# Patient Record
Sex: FEMALE | Race: WHITE | NOT HISPANIC OR LATINO | URBAN - METROPOLITAN AREA
[De-identification: names, ages, dates, MRNs, and addresses within clinical notes are randomized per-mention and may not be internally consistent; named-entity substitution may affect disease eponyms.]

---

## 2023-04-05 ENCOUNTER — APPOINTMENT (RX ONLY)
Dept: URBAN - METROPOLITAN AREA CLINIC 60 | Facility: CLINIC | Age: 63
Setting detail: DERMATOLOGY
End: 2023-04-05

## 2023-04-05 PROBLEM — D48.5 NEOPLASM OF UNCERTAIN BEHAVIOR OF SKIN: Status: ACTIVE | Noted: 2023-04-05

## 2023-04-05 PROCEDURE — 11102 TANGNTL BX SKIN SINGLE LES: CPT

## 2023-04-05 PROCEDURE — ? BIOPSY BY SHAVE METHOD

## 2023-04-05 NOTE — PROCEDURE: BIOPSY BY SHAVE METHOD
Detail Level: Detailed
Depth Of Biopsy: dermis
Was A Bandage Applied: Yes
Size Of Lesion In Cm: 0.3
X Size Of Lesion In Cm: 0
Anticipated Plan (Based On Presumed Biopsy Results): Send notes to outside derm.
Biopsy Type: H and E
Biopsy Method: Dermablade
Anesthesia Type: 1% lidocaine with epinephrine
Anesthesia Volume In Cc (Will Not Render If 0): 0.5
Hemostasis: Drysol
Wound Care: Petrolatum
Dressing: bandage
Destruction After The Procedure: No
Type Of Destruction Used: Curettage
Curettage Text: The wound bed was treated with curettage after the biopsy was performed.
Cryotherapy Text: The wound bed was treated with cryotherapy after the biopsy was performed.
Electrodesiccation Text: The wound bed was treated with electrodesiccation after the biopsy was performed.
Electrodesiccation And Curettage Text: The wound bed was treated with electrodesiccation and curettage after the biopsy was performed.
Silver Nitrate Text: The wound bed was treated with silver nitrate after the biopsy was performed.
Lab: -10
Lab Facility: [id number]
Path Notes (To The Dermatopathologist): Please test black speck too.
Consent: Written consent was obtained and risks were reviewed including but not limited to scarring, infection, bleeding, scabbing, incomplete removal, nerve damage and allergy to anesthesia.
Post-Care Instructions: I reviewed with the patient in detail post-care instructions. Patient is to keep the biopsy site dry overnight, and then apply bacitracin twice daily until healed. Patient may apply hydrogen peroxide soaks to remove any crusting.
Notification Instructions: Patient will be notified of biopsy results. However, patient instructed to call the office if not contacted within 2 weeks.
Billing Type: Third-Party Bill
Information: Selecting Yes will display possible errors in your note based on the variables you have selected. This validation is only offered as a suggestion for you. PLEASE NOTE THAT THE VALIDATION TEXT WILL BE REMOVED WHEN YOU FINALIZE YOUR NOTE. IF YOU WANT TO FAX A PRELIMINARY NOTE YOU WILL NEED TO TOGGLE THIS TO 'NO' IF YOU DO NOT WANT IT IN YOUR FAXED NOTE.

## 2023-11-20 ENCOUNTER — DOCUMENTATION (OUTPATIENT)
Dept: RADIATION ONCOLOGY | Facility: HOSPITAL | Age: 63
End: 2023-11-20
Payer: COMMERCIAL

## 2023-11-20 NOTE — PROGRESS NOTES
PMH:  Former smoker    Family hx :  Cancer GI tract.    Dr. Oconnell referral:      9/21/21 bx right nose:  Basal cell carcinoma, infiltrative pattern (Pigmented).    Offered Mohs vs re-excision - pt choose re-excision for personal reasons.    11/13/23 Right nasal sidewall:  Basal cell carcinoma, infiltrative and nodular types, Pigmented - lesion extends to the deep margin of the specimen.  Excised down to perichondrium.

## 2023-11-27 PROBLEM — C44.311 BASAL CELL CARCINOMA OF DORSUM OF NOSE: Status: ACTIVE | Noted: 2023-11-27

## 2023-11-28 ENCOUNTER — HOSPITAL ENCOUNTER (OUTPATIENT)
Dept: RADIATION ONCOLOGY | Facility: HOSPITAL | Age: 63
Setting detail: RADIATION/ONCOLOGY SERIES
Discharge: HOME | End: 2023-11-28
Attending: RADIOLOGY
Payer: COMMERCIAL

## 2023-11-28 VITALS
DIASTOLIC BLOOD PRESSURE: 76 MMHG | WEIGHT: 119 LBS | OXYGEN SATURATION: 99 % | SYSTOLIC BLOOD PRESSURE: 139 MMHG | HEART RATE: 99 BPM

## 2023-11-28 DIAGNOSIS — C44.311 BASAL CELL CARCINOMA OF DORSUM OF NOSE: Primary | ICD-10-CM

## 2023-11-28 RX ORDER — VITAMIN B COMPLEX
1 CAPSULE ORAL DAILY
COMMUNITY

## 2023-11-28 RX ORDER — THYROID 60 MG/1
60 TABLET ORAL DAILY
COMMUNITY
End: 2024-06-13 | Stop reason: ALTCHOICE

## 2023-11-28 RX ORDER — VIT C/E/ZN/COPPR/LUTEIN/ZEAXAN 250MG-90MG
5000 CAPSULE ORAL DAILY
COMMUNITY

## 2023-11-28 RX ORDER — LANOLIN ALCOHOL/MO/W.PET/CERES
400 CREAM (GRAM) TOPICAL DAILY
COMMUNITY

## 2023-11-28 ASSESSMENT — PAIN SCALES - GENERAL: PAINLEVEL: 0-NO PAIN

## 2023-11-28 ASSESSMENT — ENCOUNTER SYMPTOMS
PSYCHIATRIC NEGATIVE: 1
MUSCULOSKELETAL NEGATIVE: 1
GASTROINTESTINAL NEGATIVE: 1
CONSTITUTIONAL NEGATIVE: 1
DIFFICULTY URINATING: 0
RESPIRATORY NEGATIVE: 1
OCCASIONAL FEELINGS OF UNSTEADINESS: 0
HEMOPTYSIS: 0
NEUROLOGICAL NEGATIVE: 1
COUGH: 0
CARDIOVASCULAR NEGATIVE: 1
WOUND: 0
PALPITATIONS: 0
EYE PROBLEMS: 1

## 2023-11-28 NOTE — PROGRESS NOTES
Patient ID:  Geneva Martin is a 63 y.o. female.    Referring Physician:   No referring provider defined for this encounter.    Primary Care Provider:  Tomás Pollard DO     Cancer Staging Information:  Cancer Staging   No matching staging information was found for the patient.      Problem List:  Patient Active Problem List    Diagnosis Date Noted    Basal cell carcinoma of dorsum of nose 11/27/2023       Chief Complaint  Chief Complaint   Patient presents with    Skin Cancer     Consult       Subjective      History of Present Illness:  The following portions of the patient's history were reviewed and updated as appropriate: allergies, current medications, past family history, past medical history, past social history and past surgical history.   HPI patient is a fair skinned 63-year-old female who recently developed a lesion on the right side of her nose.  Patient was seen by Dr. Joseph Oconnell plastic surgeon who did a biopsy which showed infiltrative pattern basal cell carcinoma.  Biopsy was done in September 2023.  Patient was offered here for Mohs surgery or reexcision shows reexcision which took place 11/13/2023.  Final pathology showed infiltrative and nodular types basal cell carcinoma with lesion extending to deep margin of the specimen.  According to Dr. Oconnell he excised down to the perichondrium.  Patient was offered either postop radiation therapy versus Mohs surgery for positive deep margin.  Patient presents for consideration of radiation therapy.    Review of Systems:  Review of Systems   Eyes: Positive for eye problems.   Respiratory: Negative for cough and hemoptysis.    Skin: Negative for itching, rash and wound.        Past Medical/Surgical History  Past Medical History:   Diagnosis Date    Skin cancer      Past Surgical History:   Procedure Laterality Date    COLONOSCOPY      SKIN BIOPSY          Current Medications  Current Outpatient Medications   Medication Sig Dispense  Refill    b complex vitamins capsule Take 1 capsule by mouth daily. Folate and b6      cholecalciferol, vitamin D3, 5,000 unit (125 mcg) capsule Take 5,000 Units by mouth daily.      magnesium oxide (MAG-OX) 400 mg (241.3 mg magnesium) tablet Take 400 mg by mouth daily.      thyroid, pork, 60 mg tablet Take 60 mg by mouth daily.       No current facility-administered medications for this encounter.       Allergies  No Known Allergies    Social History  Social History     Socioeconomic History    Marital status:      Spouse name: None    Number of children: 3    Years of education: None    Highest education level: None   Occupational History    Occupation: Retired PT   Tobacco Use    Smoking status: Former     Types: Cigarettes    Smokeless tobacco: Never    Tobacco comments:     In College   Substance and Sexual Activity    Alcohol use: Never    Drug use: Never   Social History Narrative    No distress at this time       Family History  Family History   Problem Relation Age of Onset    Colon cancer Biological Mother     Colon cancer Biological Father     Cervical cancer Maternal Grandmother     No Known Problems Maternal Grandfather       Family Status   Relation Name Status    Bio Mother  (Not Specified)    Bio Father  (Not Specified)    MGM  (Not Specified)    MGF  (Not Specified)               Objective      Physical Exam:  Vital Signs for this encounter:  BSA: There is no height or weight on file to calculate BSA.  Visit Vitals  /76 (Patient Position: Sitting)   Pulse 99   Wt 54 kg (119 lb)   LMP  (LMP Unknown)   SpO2 99%         Physical Exam fair skinned healthy-appearing middle-age appearing female in no apparent distress.  Skin is clear except for small quarter centimeter healed incision right side of nose.  No suspicious lesions or palpable lesions in the vicinity.  Nostrils clear.  No adenopathy appreciated.  Lungs clear.  Performance Status:90     PAIN ASSESSMENT:  Score  Zero    Location    Pain Intervention      LABS:  No results found for this or any previous visit (from the past 4368 hour(s)).       Assessment/Plan Discussed with patient regarding postop radiation therapy to the right side of nose for deep positive margin basal cell skin cancer.  Discussed risks and benefits of treatment including acute effects including skin erythema and tiredness.  Do not expect significant late effects although telangiectasia is possible.  I would certainly stay off her eyes.  I would plan to deliver 5000 cGy in 20 fractions over 4 weeks using electron therapy.  Patient is contemplating her options and is meeting with Mohs surgeon to discuss that option.  Patient knows to contact me if she wants to pursue postop radiation therapy.  She will follow-up with her surgeon.    Diagnoses and Orders Associated With This Visit:  Basal cell carcinoma of dorsum of nose (Primary)      TREATMENT PLAN SUMMARY:         IV CHEMOTHERAPY:  [No matching plan found]

## 2023-11-28 NOTE — PROGRESS NOTES
Review of Systems   Constitutional: Negative.    HENT:  Negative.  Negative for nosebleeds.    Eyes: Positive for eye problems (B/L hx:  vitrius hemorraghe/ macular puckers.  See black spots.  has retinal specialist).   Respiratory: Negative.    Cardiovascular: Negative.  Negative for palpitations (No Pacemaker/No loop/No defib).   Gastrointestinal: Negative.    Genitourinary: Negative for difficulty urinating and vaginal bleeding.    Musculoskeletal: Negative.    Skin: Negative.    Neurological: Negative.    Psychiatric/Behavioral: Negative.      Pt to stay in touch with Dr. Oconnell with decision.

## 2023-11-28 NOTE — LETTER
November 28, 2023                                                          Patient: Geneva Martin   YOB: 1960   Date of Visit: 11/28/2023       Dear Dr. Pollard:    The patient is seen at the [facility] RADIATION THERAPY today. Attached is my assessment and plan of care.  Thank you for the opportunity to share in Geneva Martin's care.       Sincerely,        Gregory J. Ochsner, MD      CC: No Recipients

## 2023-12-07 ENCOUNTER — HOSPITAL ENCOUNTER (OUTPATIENT)
Dept: RADIATION ONCOLOGY | Facility: HOSPITAL | Age: 63
Setting detail: RADIATION/ONCOLOGY SERIES
Discharge: HOME | End: 2023-12-07
Attending: RADIOLOGY
Payer: COMMERCIAL

## 2023-12-07 PROCEDURE — 77290 THER RAD SIMULAJ FIELD CPLX: CPT | Performed by: RADIOLOGY

## 2023-12-07 PROCEDURE — 77321 SPECIAL TELETX PORT PLAN: CPT | Performed by: RADIOLOGY

## 2023-12-07 PROCEDURE — 77334 RADIATION TREATMENT AID(S): CPT | Performed by: RADIOLOGY

## 2023-12-14 ENCOUNTER — HOSPITAL ENCOUNTER (OUTPATIENT)
Dept: RADIATION ONCOLOGY | Facility: HOSPITAL | Age: 63
Setting detail: RADIATION/ONCOLOGY SERIES
Discharge: HOME | End: 2023-12-14
Attending: RADIOLOGY
Payer: COMMERCIAL

## 2023-12-14 LAB
ARIA ZRC COURSE ID: NORMAL
ARIA ZRC COURSE INTENT: NORMAL
ARIA ZRC COURSE START DATE: NORMAL
ARIA ZRC TREATMENT ELAPSED DAYS: NORMAL
ARIA ZRP FRACTIONS TREATED TO DATE: NORMAL
ARIA ZRP PLAN ID: NORMAL
ARIA ZRP PRESCRIBED DOSE CGY: 5000
ARIA ZRP PRESCRIBED DOSE PER FRACTION: 2.5
ARIA ZRR DOSAGE GIVEN TO DATE: 2.5
ARIA ZRR DOSAGE GIVEN TO DATE: 2.78
ARIA ZRR REFERENCE POINT ID: NORMAL
ARIA ZRR REFERENCE POINT ID: NORMAL
ARIA ZRR SESSION DOSAGE GIVEN: 2.5
ARIA ZRR SESSION DOSAGE GIVEN: 2.78
MLH ARIA ZRC TREATMENT DATES: NORMAL

## 2023-12-14 PROCEDURE — 77280 THER RAD SIMULAJ FIELD SMPL: CPT | Performed by: RADIOLOGY

## 2023-12-14 PROCEDURE — 77412 RADIATION TX DELIVERY LVL 3: CPT | Performed by: RADIOLOGY

## 2023-12-14 PROCEDURE — 77332 RADIATION TREATMENT AID(S): CPT | Performed by: RADIOLOGY

## 2023-12-15 ENCOUNTER — HOSPITAL ENCOUNTER (OUTPATIENT)
Dept: RADIATION ONCOLOGY | Facility: HOSPITAL | Age: 63
Setting detail: RADIATION/ONCOLOGY SERIES
Discharge: HOME | End: 2023-12-15
Attending: RADIOLOGY
Payer: COMMERCIAL

## 2023-12-15 LAB
ARIA ZRC COURSE ID: NORMAL
ARIA ZRC COURSE INTENT: NORMAL
ARIA ZRC COURSE START DATE: NORMAL
ARIA ZRC TREATMENT ELAPSED DAYS: NORMAL
ARIA ZRP FRACTIONS TREATED TO DATE: NORMAL
ARIA ZRP PLAN ID: NORMAL
ARIA ZRP PRESCRIBED DOSE CGY: 5000
ARIA ZRP PRESCRIBED DOSE PER FRACTION: 2.5
ARIA ZRR DOSAGE GIVEN TO DATE: 5
ARIA ZRR DOSAGE GIVEN TO DATE: 5.56
ARIA ZRR REFERENCE POINT ID: NORMAL
ARIA ZRR REFERENCE POINT ID: NORMAL
ARIA ZRR SESSION DOSAGE GIVEN: 2.5
ARIA ZRR SESSION DOSAGE GIVEN: 2.78
MLH ARIA ZRC TREATMENT DATES: NORMAL

## 2023-12-15 PROCEDURE — 77412 RADIATION TX DELIVERY LVL 3: CPT | Performed by: RADIOLOGY

## 2023-12-18 ENCOUNTER — HOSPITAL ENCOUNTER (OUTPATIENT)
Dept: RADIATION ONCOLOGY | Facility: HOSPITAL | Age: 63
Setting detail: RADIATION/ONCOLOGY SERIES
Discharge: HOME | End: 2023-12-18
Attending: RADIOLOGY
Payer: COMMERCIAL

## 2023-12-18 LAB
ARIA ZRC COURSE ID: NORMAL
ARIA ZRC COURSE INTENT: NORMAL
ARIA ZRC COURSE START DATE: NORMAL
ARIA ZRC TREATMENT ELAPSED DAYS: NORMAL
ARIA ZRP FRACTIONS TREATED TO DATE: NORMAL
ARIA ZRP PLAN ID: NORMAL
ARIA ZRP PRESCRIBED DOSE CGY: 5000
ARIA ZRP PRESCRIBED DOSE PER FRACTION: 2.5
ARIA ZRR DOSAGE GIVEN TO DATE: 7.5
ARIA ZRR DOSAGE GIVEN TO DATE: 8.33
ARIA ZRR REFERENCE POINT ID: NORMAL
ARIA ZRR REFERENCE POINT ID: NORMAL
ARIA ZRR SESSION DOSAGE GIVEN: 2.5
ARIA ZRR SESSION DOSAGE GIVEN: 2.78
MLH ARIA ZRC TREATMENT DATES: NORMAL

## 2023-12-18 PROCEDURE — 77412 RADIATION TX DELIVERY LVL 3: CPT | Performed by: RADIOLOGY

## 2023-12-19 ENCOUNTER — RAD ONC OTV (OUTPATIENT)
Dept: RADIATION ONCOLOGY | Facility: HOSPITAL | Age: 63
End: 2023-12-19
Payer: COMMERCIAL

## 2023-12-19 ENCOUNTER — HOSPITAL ENCOUNTER (OUTPATIENT)
Dept: RADIATION ONCOLOGY | Facility: HOSPITAL | Age: 63
Setting detail: RADIATION/ONCOLOGY SERIES
Discharge: HOME | End: 2023-12-19
Attending: RADIOLOGY
Payer: COMMERCIAL

## 2023-12-19 VITALS
HEART RATE: 72 BPM | WEIGHT: 119 LBS | TEMPERATURE: 98 F | OXYGEN SATURATION: 100 % | RESPIRATION RATE: 18 BRPM | DIASTOLIC BLOOD PRESSURE: 78 MMHG | SYSTOLIC BLOOD PRESSURE: 135 MMHG

## 2023-12-19 DIAGNOSIS — C44.311 BASAL CELL CARCINOMA OF DORSUM OF NOSE: Primary | ICD-10-CM

## 2023-12-19 LAB
ARIA ZRC COURSE ID: NORMAL
ARIA ZRC COURSE INTENT: NORMAL
ARIA ZRC COURSE START DATE: NORMAL
ARIA ZRC TREATMENT ELAPSED DAYS: NORMAL
ARIA ZRP FRACTIONS TREATED TO DATE: NORMAL
ARIA ZRP PLAN ID: NORMAL
ARIA ZRP PRESCRIBED DOSE CGY: 5000
ARIA ZRP PRESCRIBED DOSE PER FRACTION: 2.5
ARIA ZRR DOSAGE GIVEN TO DATE: 10
ARIA ZRR DOSAGE GIVEN TO DATE: 11.11
ARIA ZRR REFERENCE POINT ID: NORMAL
ARIA ZRR REFERENCE POINT ID: NORMAL
ARIA ZRR SESSION DOSAGE GIVEN: 2.5
ARIA ZRR SESSION DOSAGE GIVEN: 2.78
MLH ARIA ZRC TREATMENT DATES: NORMAL

## 2023-12-19 PROCEDURE — 77412 RADIATION TX DELIVERY LVL 3: CPT | Performed by: RADIOLOGY

## 2023-12-19 ASSESSMENT — ENCOUNTER SYMPTOMS
ALLERGIC/IMMUNOLOGIC NEGATIVE: 1
GASTROINTESTINAL NEGATIVE: 1
RESPIRATORY NEGATIVE: 1
ENDOCRINE NEGATIVE: 1
CONSTITUTIONAL NEGATIVE: 1
NEUROLOGICAL NEGATIVE: 1
MUSCULOSKELETAL NEGATIVE: 1
PSYCHIATRIC NEGATIVE: 1
EYES NEGATIVE: 1
HEMATOLOGIC/LYMPHATIC NEGATIVE: 1
CARDIOVASCULAR NEGATIVE: 1

## 2023-12-19 ASSESSMENT — PAIN SCALES - GENERAL: PAINLEVEL: 0-NO PAIN

## 2023-12-19 NOTE — LETTER
December 19, 2023                                                          Patient: Geneva Martin   YOB: 1960   Date of Visit: 12/19/2023       Dear Dr. Pollard:    The patient is seen at the [facility] RADIATION THERAPY today. Attached is my assessment and plan of care.  Thank you for the opportunity to share in Geneva Martin's care.       Sincerely,        Gregory J. Ochsner, MD      CC: No Recipients

## 2023-12-19 NOTE — PROGRESS NOTES
Subjective      Patient ID: Geneva Martin is a 63 y.o. female.  1960   Diagnosis Plan   1. Basal cell carcinoma of dorsum of nose          Diagnosis:  No diagnosis found.    HPI patient without complaints.    The following have been reviewed and updated as appropriate in this visit:        Review of Systems   Constitutional: Negative.    HENT: Negative.    Eyes: Negative.    Respiratory: Negative.    Cardiovascular: Negative.    Gastrointestinal: Negative.    Endocrine: Negative.    Genitourinary: Negative.    Musculoskeletal: Negative.    Allergic/Immunologic: Negative.    Neurological: Negative.    Hematological: Negative.    Psychiatric/Behavioral: Negative.        Objective     There were no vitals filed for this visit.  There is no height or weight on file to calculate BMI.    Physical Exam healed half centimeter incision right side of nose without skin changes otherwise.  Performance status 90.    Assessment/Plan Continue postop radiation therapy for basal cell skin cancer right side of nose.  Diagnoses and Orders Associated With This Visit:  There are no diagnoses linked to this encounter.

## 2023-12-20 ENCOUNTER — HOSPITAL ENCOUNTER (OUTPATIENT)
Dept: RADIATION ONCOLOGY | Facility: HOSPITAL | Age: 63
Setting detail: RADIATION/ONCOLOGY SERIES
Discharge: HOME | End: 2023-12-20
Attending: RADIOLOGY
Payer: COMMERCIAL

## 2023-12-20 LAB
ARIA ZRC COURSE ID: NORMAL
ARIA ZRC COURSE INTENT: NORMAL
ARIA ZRC COURSE START DATE: NORMAL
ARIA ZRC TREATMENT ELAPSED DAYS: NORMAL
ARIA ZRP FRACTIONS TREATED TO DATE: NORMAL
ARIA ZRP PLAN ID: NORMAL
ARIA ZRP PRESCRIBED DOSE CGY: 5000
ARIA ZRP PRESCRIBED DOSE PER FRACTION: 2.5
ARIA ZRR DOSAGE GIVEN TO DATE: 12.5
ARIA ZRR DOSAGE GIVEN TO DATE: 13.89
ARIA ZRR REFERENCE POINT ID: NORMAL
ARIA ZRR REFERENCE POINT ID: NORMAL
ARIA ZRR SESSION DOSAGE GIVEN: 2.5
ARIA ZRR SESSION DOSAGE GIVEN: 2.78
MLH ARIA ZRC TREATMENT DATES: NORMAL

## 2023-12-20 PROCEDURE — 77412 RADIATION TX DELIVERY LVL 3: CPT | Performed by: RADIOLOGY

## 2023-12-20 PROCEDURE — 77336 RADIATION PHYSICS CONSULT: CPT | Performed by: RADIOLOGY

## 2023-12-21 ENCOUNTER — HOSPITAL ENCOUNTER (OUTPATIENT)
Dept: RADIATION ONCOLOGY | Facility: HOSPITAL | Age: 63
Setting detail: RADIATION/ONCOLOGY SERIES
Discharge: HOME | End: 2023-12-21
Attending: RADIOLOGY
Payer: COMMERCIAL

## 2023-12-21 LAB
ARIA ZRC COURSE ID: NORMAL
ARIA ZRC COURSE INTENT: NORMAL
ARIA ZRC COURSE START DATE: NORMAL
ARIA ZRC TREATMENT ELAPSED DAYS: NORMAL
ARIA ZRP FRACTIONS TREATED TO DATE: NORMAL
ARIA ZRP PLAN ID: NORMAL
ARIA ZRP PRESCRIBED DOSE CGY: 5000
ARIA ZRP PRESCRIBED DOSE PER FRACTION: 2.5
ARIA ZRR DOSAGE GIVEN TO DATE: 15
ARIA ZRR DOSAGE GIVEN TO DATE: 16.67
ARIA ZRR REFERENCE POINT ID: NORMAL
ARIA ZRR REFERENCE POINT ID: NORMAL
ARIA ZRR SESSION DOSAGE GIVEN: 2.5
ARIA ZRR SESSION DOSAGE GIVEN: 2.78
MLH ARIA ZRC TREATMENT DATES: NORMAL

## 2023-12-21 PROCEDURE — 77412 RADIATION TX DELIVERY LVL 3: CPT | Performed by: RADIOLOGY

## 2023-12-22 ENCOUNTER — HOSPITAL ENCOUNTER (OUTPATIENT)
Dept: RADIATION ONCOLOGY | Facility: HOSPITAL | Age: 63
Setting detail: RADIATION/ONCOLOGY SERIES
Discharge: HOME | End: 2023-12-22
Attending: RADIOLOGY
Payer: COMMERCIAL

## 2023-12-22 LAB
ARIA ZRC COURSE ID: NORMAL
ARIA ZRC COURSE INTENT: NORMAL
ARIA ZRC COURSE START DATE: NORMAL
ARIA ZRC TREATMENT ELAPSED DAYS: NORMAL
ARIA ZRP FRACTIONS TREATED TO DATE: NORMAL
ARIA ZRP PLAN ID: NORMAL
ARIA ZRP PRESCRIBED DOSE CGY: 5000
ARIA ZRP PRESCRIBED DOSE PER FRACTION: 2.5
ARIA ZRR DOSAGE GIVEN TO DATE: 17.5
ARIA ZRR DOSAGE GIVEN TO DATE: 19.44
ARIA ZRR REFERENCE POINT ID: NORMAL
ARIA ZRR REFERENCE POINT ID: NORMAL
ARIA ZRR SESSION DOSAGE GIVEN: 2.5
ARIA ZRR SESSION DOSAGE GIVEN: 2.78
MLH ARIA ZRC TREATMENT DATES: NORMAL

## 2023-12-22 PROCEDURE — 77412 RADIATION TX DELIVERY LVL 3: CPT | Performed by: RADIOLOGY

## 2023-12-26 ENCOUNTER — RAD ONC OTV (OUTPATIENT)
Dept: RADIATION ONCOLOGY | Facility: HOSPITAL | Age: 63
End: 2023-12-26
Payer: COMMERCIAL

## 2023-12-26 ENCOUNTER — HOSPITAL ENCOUNTER (OUTPATIENT)
Dept: RADIATION ONCOLOGY | Facility: HOSPITAL | Age: 63
Setting detail: RADIATION/ONCOLOGY SERIES
Discharge: HOME | End: 2023-12-26
Attending: RADIOLOGY
Payer: COMMERCIAL

## 2023-12-26 VITALS — SYSTOLIC BLOOD PRESSURE: 132 MMHG | HEART RATE: 112 BPM | DIASTOLIC BLOOD PRESSURE: 75 MMHG

## 2023-12-26 DIAGNOSIS — C44.311 BASAL CELL CARCINOMA OF DORSUM OF NOSE: Primary | ICD-10-CM

## 2023-12-26 LAB
ARIA ZRC COURSE ID: NORMAL
ARIA ZRC COURSE INTENT: NORMAL
ARIA ZRC COURSE START DATE: NORMAL
ARIA ZRC TREATMENT ELAPSED DAYS: NORMAL
ARIA ZRP FRACTIONS TREATED TO DATE: NORMAL
ARIA ZRP PLAN ID: NORMAL
ARIA ZRP PRESCRIBED DOSE CGY: 5000
ARIA ZRP PRESCRIBED DOSE PER FRACTION: 2.5
ARIA ZRR DOSAGE GIVEN TO DATE: 20
ARIA ZRR DOSAGE GIVEN TO DATE: 22.22
ARIA ZRR REFERENCE POINT ID: NORMAL
ARIA ZRR REFERENCE POINT ID: NORMAL
ARIA ZRR SESSION DOSAGE GIVEN: 2.5
ARIA ZRR SESSION DOSAGE GIVEN: 2.78
MLH ARIA ZRC TREATMENT DATES: NORMAL

## 2023-12-26 PROCEDURE — 77412 RADIATION TX DELIVERY LVL 3: CPT | Performed by: RADIOLOGY

## 2023-12-26 ASSESSMENT — ENCOUNTER SYMPTOMS
MUSCULOSKELETAL NEGATIVE: 1
COLOR CHANGE: 1
PSYCHIATRIC NEGATIVE: 1
CONSTITUTIONAL NEGATIVE: 1

## 2023-12-26 ASSESSMENT — PAIN SCALES - GENERAL: PAINLEVEL: 0-NO PAIN

## 2023-12-26 NOTE — LETTER
December 26, 2023                                                          Patient: Geneva Martin   YOB: 1960   Date of Visit: 12/26/2023       Dear Dr. Pollard:    The patient is seen at the [facility] RADIATION THERAPY today. Attached is my assessment and plan of care.  Thank you for the opportunity to share in Geneva Martin's care.       Sincerely,        Gregory J. Ochsner, MD      CC: No Recipients

## 2023-12-26 NOTE — PROGRESS NOTES
Subjective      Patient ID: Geneva Martin is a 63 y.o. female.  1960   Diagnosis Plan   1. Basal cell carcinoma of dorsum of nose          Diagnosis:  No diagnosis found.    HPI note small amount of blood from nostril otherwise without complaints.    The following have been reviewed and updated as appropriate in this visit:        Review of Systems   Constitutional: Negative.    HENT: Positive for nosebleeds (strands of blood only).    Musculoskeletal: Negative.    Skin: Positive for color change (scant erythema/ pt is using aquaphor.).   Psychiatric/Behavioral: Negative.        Objective     Vitals:    12/26/23 0835   BP: 132/75   Patient Position: Sitting   Pulse: (!) 112     There is no height or weight on file to calculate BMI.    Physical Exam healed incision right-sided nose otherwise skin clear.  No erythema noted.  Nostrils clear.  Performance status 90.    Assessment/Plan Continue postop radiation therapy to right side of the nose for early basal cell cancer as previously outlined.  Continue Aquaphor ointment in nostril and around as needed.  Patient also to use over-the-counter moisturizer for mild radiation dermatitis pending.  Diagnoses and Orders Associated With This Visit:  There are no diagnoses linked to this encounter.

## 2023-12-27 ENCOUNTER — HOSPITAL ENCOUNTER (OUTPATIENT)
Dept: RADIATION ONCOLOGY | Facility: HOSPITAL | Age: 63
Setting detail: RADIATION/ONCOLOGY SERIES
Discharge: HOME | End: 2023-12-27
Attending: RADIOLOGY
Payer: COMMERCIAL

## 2023-12-27 LAB
ARIA ZRC COURSE ID: NORMAL
ARIA ZRC COURSE INTENT: NORMAL
ARIA ZRC COURSE START DATE: NORMAL
ARIA ZRC TREATMENT ELAPSED DAYS: NORMAL
ARIA ZRP FRACTIONS TREATED TO DATE: NORMAL
ARIA ZRP PLAN ID: NORMAL
ARIA ZRP PRESCRIBED DOSE CGY: 5000
ARIA ZRP PRESCRIBED DOSE PER FRACTION: 2.5
ARIA ZRR DOSAGE GIVEN TO DATE: 22.5
ARIA ZRR DOSAGE GIVEN TO DATE: 25
ARIA ZRR REFERENCE POINT ID: NORMAL
ARIA ZRR REFERENCE POINT ID: NORMAL
ARIA ZRR SESSION DOSAGE GIVEN: 2.5
ARIA ZRR SESSION DOSAGE GIVEN: 2.78
MLH ARIA ZRC TREATMENT DATES: NORMAL

## 2023-12-27 PROCEDURE — 77412 RADIATION TX DELIVERY LVL 3: CPT | Performed by: RADIOLOGY

## 2023-12-27 PROCEDURE — 77336 RADIATION PHYSICS CONSULT: CPT | Performed by: RADIOLOGY

## 2023-12-28 ENCOUNTER — HOSPITAL ENCOUNTER (OUTPATIENT)
Dept: RADIATION ONCOLOGY | Facility: HOSPITAL | Age: 63
Setting detail: RADIATION/ONCOLOGY SERIES
Discharge: HOME | End: 2023-12-28
Attending: RADIOLOGY
Payer: COMMERCIAL

## 2023-12-28 LAB
ARIA ZRC COURSE ID: NORMAL
ARIA ZRC COURSE INTENT: NORMAL
ARIA ZRC COURSE START DATE: NORMAL
ARIA ZRC TREATMENT ELAPSED DAYS: NORMAL
ARIA ZRP FRACTIONS TREATED TO DATE: NORMAL
ARIA ZRP PLAN ID: NORMAL
ARIA ZRP PRESCRIBED DOSE CGY: 5000
ARIA ZRP PRESCRIBED DOSE PER FRACTION: 2.5
ARIA ZRR DOSAGE GIVEN TO DATE: 25
ARIA ZRR DOSAGE GIVEN TO DATE: 27.78
ARIA ZRR REFERENCE POINT ID: NORMAL
ARIA ZRR REFERENCE POINT ID: NORMAL
ARIA ZRR SESSION DOSAGE GIVEN: 2.5
ARIA ZRR SESSION DOSAGE GIVEN: 2.78
MLH ARIA ZRC TREATMENT DATES: NORMAL

## 2023-12-28 PROCEDURE — 77412 RADIATION TX DELIVERY LVL 3: CPT | Performed by: RADIOLOGY

## 2023-12-29 ENCOUNTER — HOSPITAL ENCOUNTER (OUTPATIENT)
Dept: RADIATION ONCOLOGY | Facility: HOSPITAL | Age: 63
Setting detail: RADIATION/ONCOLOGY SERIES
Discharge: HOME | End: 2023-12-29
Attending: RADIOLOGY
Payer: COMMERCIAL

## 2023-12-29 LAB
ARIA ZRC COURSE ID: NORMAL
ARIA ZRC COURSE INTENT: NORMAL
ARIA ZRC COURSE START DATE: NORMAL
ARIA ZRC TREATMENT ELAPSED DAYS: NORMAL
ARIA ZRP FRACTIONS TREATED TO DATE: NORMAL
ARIA ZRP PLAN ID: NORMAL
ARIA ZRP PRESCRIBED DOSE CGY: 5000
ARIA ZRP PRESCRIBED DOSE PER FRACTION: 2.5
ARIA ZRR DOSAGE GIVEN TO DATE: 27.5
ARIA ZRR DOSAGE GIVEN TO DATE: 30.56
ARIA ZRR REFERENCE POINT ID: NORMAL
ARIA ZRR REFERENCE POINT ID: NORMAL
ARIA ZRR SESSION DOSAGE GIVEN: 2.5
ARIA ZRR SESSION DOSAGE GIVEN: 2.78
MLH ARIA ZRC TREATMENT DATES: NORMAL

## 2023-12-29 PROCEDURE — 77412 RADIATION TX DELIVERY LVL 3: CPT | Performed by: RADIOLOGY

## 2024-01-02 ENCOUNTER — HOSPITAL ENCOUNTER (OUTPATIENT)
Dept: RADIATION ONCOLOGY | Facility: HOSPITAL | Age: 64
Setting detail: RADIATION/ONCOLOGY SERIES
Discharge: HOME | End: 2024-01-02
Attending: RADIOLOGY
Payer: COMMERCIAL

## 2024-01-02 ENCOUNTER — RAD ONC OTV (OUTPATIENT)
Dept: RADIATION ONCOLOGY | Facility: HOSPITAL | Age: 64
End: 2024-01-02
Payer: COMMERCIAL

## 2024-01-02 VITALS — OXYGEN SATURATION: 100 % | DIASTOLIC BLOOD PRESSURE: 85 MMHG | SYSTOLIC BLOOD PRESSURE: 129 MMHG | HEART RATE: 99 BPM

## 2024-01-02 DIAGNOSIS — C44.311 BASAL CELL CARCINOMA OF DORSUM OF NOSE: Primary | ICD-10-CM

## 2024-01-02 LAB
ARIA ZRC COURSE ID: NORMAL
ARIA ZRC COURSE INTENT: NORMAL
ARIA ZRC COURSE START DATE: NORMAL
ARIA ZRC TREATMENT ELAPSED DAYS: NORMAL
ARIA ZRP FRACTIONS TREATED TO DATE: NORMAL
ARIA ZRP PLAN ID: NORMAL
ARIA ZRP PRESCRIBED DOSE CGY: 5000
ARIA ZRP PRESCRIBED DOSE PER FRACTION: 2.5
ARIA ZRR DOSAGE GIVEN TO DATE: 30
ARIA ZRR DOSAGE GIVEN TO DATE: 33.33
ARIA ZRR REFERENCE POINT ID: NORMAL
ARIA ZRR REFERENCE POINT ID: NORMAL
ARIA ZRR SESSION DOSAGE GIVEN: 2.5
ARIA ZRR SESSION DOSAGE GIVEN: 2.78
MLH ARIA ZRC TREATMENT DATES: NORMAL

## 2024-01-02 PROCEDURE — 77412 RADIATION TX DELIVERY LVL 3: CPT | Performed by: RADIOLOGY

## 2024-01-02 RX ORDER — SILVER SULFADIAZINE 10 G/1000G
CREAM TOPICAL 2 TIMES DAILY
Qty: 50 G | Refills: 1 | Status: SHIPPED | OUTPATIENT
Start: 2024-01-02 | End: 2024-06-13 | Stop reason: ALTCHOICE

## 2024-01-02 ASSESSMENT — ENCOUNTER SYMPTOMS
PSYCHIATRIC NEGATIVE: 1
HEMATOLOGIC/LYMPHATIC NEGATIVE: 1
OCCASIONAL FEELINGS OF UNSTEADINESS: 0
MUSCULOSKELETAL NEGATIVE: 1
EYES NEGATIVE: 1
COLOR CHANGE: 1
NEUROLOGICAL NEGATIVE: 1
GASTROINTESTINAL NEGATIVE: 1
CONSTITUTIONAL NEGATIVE: 1
CARDIOVASCULAR NEGATIVE: 1
ENDOCRINE NEGATIVE: 1
ALLERGIC/IMMUNOLOGIC NEGATIVE: 1
RESPIRATORY NEGATIVE: 1

## 2024-01-02 ASSESSMENT — PAIN SCALES - GENERAL: PAINLEVEL: 0-NO PAIN

## 2024-01-02 NOTE — LETTER
January 2, 2024                                                          Patient: Geneva Martin   YOB: 1960   Date of Visit: 1/2/2024       Dear Dr. Pollard:    The patient is seen at the [facility] RADIATION THERAPY today. Attached is my assessment and plan of care.  Thank you for the opportunity to share in Geneva Martin's care.       Sincerely,        Gregory J. Ochsner, MD      CC: No Recipients

## 2024-01-02 NOTE — PROGRESS NOTES
Subjective      Patient ID: Geneva Martin is a 63 y.o. female.  1960   Diagnosis Plan   1. Basal cell carcinoma of dorsum of nose          Diagnosis:  No diagnosis found.    HPI patient notes skin erythema discomfort helped with Aquaphor ointment.  Blood per nostril with dried blood in the morning.  Nasal congestion.    The following have been reviewed and updated as appropriate in this visit:        Review of Systems   Constitutional: Negative.    HENT: Positive for nosebleeds (bleeding on occasion ), postnasal drip (allergy related ) and sneezing (increased sneezing when feeling dry).    Eyes: Negative.    Respiratory: Negative.    Cardiovascular: Negative.    Gastrointestinal: Negative.    Endocrine: Negative.    Genitourinary: Negative.    Musculoskeletal: Negative.    Skin: Positive for color change (slight erythema ).   Allergic/Immunologic: Negative.    Neurological: Negative.    Hematological: Negative.    Psychiatric/Behavioral: Negative.        Objective     Vitals:    01/02/24 0914   BP: 129/85   BP Location: Right upper arm   Patient Position: Sitting   Pulse: 99   SpO2: 100%     There is no height or weight on file to calculate BMI.    Physical Exam mild to moderate there is skin erythema right side of nose consistent with radiation dermatitis.  No desquamation noted.  Nostrils clear.  Performance status 90.    Assessment/Plan Continue postop radiation therapy using electron therapy as previously outlined.  Continue Aquaphor ointment prescribed Silvadene cream to replace when needed.  Goes to [place] after treatment will follow-up as needed.  Diagnoses and Orders Associated With This Visit:  There are no diagnoses linked to this encounter.

## 2024-01-03 ENCOUNTER — HOSPITAL ENCOUNTER (OUTPATIENT)
Dept: RADIATION ONCOLOGY | Facility: HOSPITAL | Age: 64
Setting detail: RADIATION/ONCOLOGY SERIES
Discharge: HOME | End: 2024-01-03
Attending: RADIOLOGY
Payer: COMMERCIAL

## 2024-01-03 LAB
ARIA ZRC COURSE ID: NORMAL
ARIA ZRC COURSE INTENT: NORMAL
ARIA ZRC COURSE START DATE: NORMAL
ARIA ZRC TREATMENT ELAPSED DAYS: NORMAL
ARIA ZRP FRACTIONS TREATED TO DATE: NORMAL
ARIA ZRP PLAN ID: NORMAL
ARIA ZRP PRESCRIBED DOSE CGY: 5000
ARIA ZRP PRESCRIBED DOSE PER FRACTION: 2.5
ARIA ZRR DOSAGE GIVEN TO DATE: 32.5
ARIA ZRR DOSAGE GIVEN TO DATE: 36.11
ARIA ZRR REFERENCE POINT ID: NORMAL
ARIA ZRR REFERENCE POINT ID: NORMAL
ARIA ZRR SESSION DOSAGE GIVEN: 2.5
ARIA ZRR SESSION DOSAGE GIVEN: 2.78
MLH ARIA ZRC TREATMENT DATES: NORMAL

## 2024-01-03 PROCEDURE — 77412 RADIATION TX DELIVERY LVL 3: CPT | Performed by: RADIOLOGY

## 2024-01-04 ENCOUNTER — HOSPITAL ENCOUNTER (OUTPATIENT)
Dept: RADIATION ONCOLOGY | Facility: HOSPITAL | Age: 64
Setting detail: RADIATION/ONCOLOGY SERIES
Discharge: HOME | End: 2024-01-04
Attending: RADIOLOGY
Payer: COMMERCIAL

## 2024-01-04 LAB
ARIA ZRC COURSE ID: NORMAL
ARIA ZRC COURSE INTENT: NORMAL
ARIA ZRC COURSE START DATE: NORMAL
ARIA ZRC TREATMENT ELAPSED DAYS: NORMAL
ARIA ZRP FRACTIONS TREATED TO DATE: NORMAL
ARIA ZRP PLAN ID: NORMAL
ARIA ZRP PRESCRIBED DOSE CGY: 5000
ARIA ZRP PRESCRIBED DOSE PER FRACTION: 2.5
ARIA ZRR DOSAGE GIVEN TO DATE: 35
ARIA ZRR DOSAGE GIVEN TO DATE: 38.89
ARIA ZRR REFERENCE POINT ID: NORMAL
ARIA ZRR REFERENCE POINT ID: NORMAL
ARIA ZRR SESSION DOSAGE GIVEN: 2.5
ARIA ZRR SESSION DOSAGE GIVEN: 2.78
MLH ARIA ZRC TREATMENT DATES: NORMAL

## 2024-01-04 PROCEDURE — 77412 RADIATION TX DELIVERY LVL 3: CPT | Performed by: RADIOLOGY

## 2024-01-04 PROCEDURE — 77336 RADIATION PHYSICS CONSULT: CPT | Performed by: RADIOLOGY

## 2024-01-05 ENCOUNTER — HOSPITAL ENCOUNTER (OUTPATIENT)
Dept: RADIATION ONCOLOGY | Facility: HOSPITAL | Age: 64
Setting detail: RADIATION/ONCOLOGY SERIES
Discharge: HOME | End: 2024-01-05
Attending: RADIOLOGY
Payer: COMMERCIAL

## 2024-01-05 LAB
ARIA ZRC COURSE ID: NORMAL
ARIA ZRC COURSE INTENT: NORMAL
ARIA ZRC COURSE START DATE: NORMAL
ARIA ZRC TREATMENT ELAPSED DAYS: NORMAL
ARIA ZRP FRACTIONS TREATED TO DATE: NORMAL
ARIA ZRP PLAN ID: NORMAL
ARIA ZRP PRESCRIBED DOSE CGY: 5000
ARIA ZRP PRESCRIBED DOSE PER FRACTION: 2.5
ARIA ZRR DOSAGE GIVEN TO DATE: 37.5
ARIA ZRR DOSAGE GIVEN TO DATE: 41.67
ARIA ZRR REFERENCE POINT ID: NORMAL
ARIA ZRR REFERENCE POINT ID: NORMAL
ARIA ZRR SESSION DOSAGE GIVEN: 2.5
ARIA ZRR SESSION DOSAGE GIVEN: 2.78
MLH ARIA ZRC TREATMENT DATES: NORMAL

## 2024-01-05 PROCEDURE — 77412 RADIATION TX DELIVERY LVL 3: CPT | Performed by: RADIOLOGY

## 2024-01-08 ENCOUNTER — HOSPITAL ENCOUNTER (OUTPATIENT)
Dept: RADIATION ONCOLOGY | Facility: HOSPITAL | Age: 64
Setting detail: RADIATION/ONCOLOGY SERIES
Discharge: HOME | End: 2024-01-08
Attending: RADIOLOGY
Payer: COMMERCIAL

## 2024-01-08 LAB
ARIA ZRC COURSE ID: NORMAL
ARIA ZRC COURSE INTENT: NORMAL
ARIA ZRC COURSE START DATE: NORMAL
ARIA ZRC TREATMENT ELAPSED DAYS: NORMAL
ARIA ZRP FRACTIONS TREATED TO DATE: NORMAL
ARIA ZRP PLAN ID: NORMAL
ARIA ZRP PRESCRIBED DOSE CGY: 5000
ARIA ZRP PRESCRIBED DOSE PER FRACTION: 2.5
ARIA ZRR DOSAGE GIVEN TO DATE: 40
ARIA ZRR DOSAGE GIVEN TO DATE: 44.44
ARIA ZRR REFERENCE POINT ID: NORMAL
ARIA ZRR REFERENCE POINT ID: NORMAL
ARIA ZRR SESSION DOSAGE GIVEN: 2.5
ARIA ZRR SESSION DOSAGE GIVEN: 2.78
MLH ARIA ZRC TREATMENT DATES: NORMAL

## 2024-01-08 PROCEDURE — 77412 RADIATION TX DELIVERY LVL 3: CPT | Performed by: RADIOLOGY

## 2024-01-09 ENCOUNTER — HOSPITAL ENCOUNTER (OUTPATIENT)
Dept: RADIATION ONCOLOGY | Facility: HOSPITAL | Age: 64
Setting detail: RADIATION/ONCOLOGY SERIES
Discharge: HOME | End: 2024-01-09
Attending: RADIOLOGY
Payer: COMMERCIAL

## 2024-01-09 LAB
ARIA ZRC COURSE ID: NORMAL
ARIA ZRC COURSE INTENT: NORMAL
ARIA ZRC COURSE START DATE: NORMAL
ARIA ZRC TREATMENT ELAPSED DAYS: NORMAL
ARIA ZRP FRACTIONS TREATED TO DATE: NORMAL
ARIA ZRP PLAN ID: NORMAL
ARIA ZRP PRESCRIBED DOSE CGY: 5000
ARIA ZRP PRESCRIBED DOSE PER FRACTION: 2.5
ARIA ZRR DOSAGE GIVEN TO DATE: 42.5
ARIA ZRR DOSAGE GIVEN TO DATE: 47.22
ARIA ZRR REFERENCE POINT ID: NORMAL
ARIA ZRR REFERENCE POINT ID: NORMAL
ARIA ZRR SESSION DOSAGE GIVEN: 2.5
ARIA ZRR SESSION DOSAGE GIVEN: 2.78
MLH ARIA ZRC TREATMENT DATES: NORMAL

## 2024-01-09 PROCEDURE — 77412 RADIATION TX DELIVERY LVL 3: CPT | Performed by: RADIOLOGY

## 2024-01-10 ENCOUNTER — HOSPITAL ENCOUNTER (OUTPATIENT)
Dept: RADIATION ONCOLOGY | Facility: HOSPITAL | Age: 64
Setting detail: RADIATION/ONCOLOGY SERIES
Discharge: HOME | End: 2024-01-10
Attending: RADIOLOGY
Payer: COMMERCIAL

## 2024-01-10 LAB
ARIA ZRC COURSE ID: NORMAL
ARIA ZRC COURSE INTENT: NORMAL
ARIA ZRC COURSE START DATE: NORMAL
ARIA ZRC TREATMENT ELAPSED DAYS: NORMAL
ARIA ZRP FRACTIONS TREATED TO DATE: NORMAL
ARIA ZRP PLAN ID: NORMAL
ARIA ZRP PRESCRIBED DOSE CGY: 5000
ARIA ZRP PRESCRIBED DOSE PER FRACTION: 2.5
ARIA ZRR DOSAGE GIVEN TO DATE: 45
ARIA ZRR DOSAGE GIVEN TO DATE: 50
ARIA ZRR REFERENCE POINT ID: NORMAL
ARIA ZRR REFERENCE POINT ID: NORMAL
ARIA ZRR SESSION DOSAGE GIVEN: 2.5
ARIA ZRR SESSION DOSAGE GIVEN: 2.78
MLH ARIA ZRC TREATMENT DATES: NORMAL

## 2024-01-10 PROCEDURE — 77412 RADIATION TX DELIVERY LVL 3: CPT | Performed by: RADIOLOGY

## 2024-01-11 ENCOUNTER — HOSPITAL ENCOUNTER (OUTPATIENT)
Dept: RADIATION ONCOLOGY | Facility: HOSPITAL | Age: 64
Setting detail: RADIATION/ONCOLOGY SERIES
Discharge: HOME | End: 2024-01-11
Attending: RADIOLOGY
Payer: COMMERCIAL

## 2024-01-11 ENCOUNTER — RAD ONC OTV (OUTPATIENT)
Dept: RADIATION ONCOLOGY | Facility: HOSPITAL | Age: 64
End: 2024-01-11
Payer: COMMERCIAL

## 2024-01-11 VITALS — HEART RATE: 90 BPM | DIASTOLIC BLOOD PRESSURE: 74 MMHG | SYSTOLIC BLOOD PRESSURE: 136 MMHG

## 2024-01-11 DIAGNOSIS — C44.311 BASAL CELL CARCINOMA OF DORSUM OF NOSE: Primary | ICD-10-CM

## 2024-01-11 LAB
ARIA ZRC COURSE ID: NORMAL
ARIA ZRC COURSE INTENT: NORMAL
ARIA ZRC COURSE START DATE: NORMAL
ARIA ZRC TREATMENT ELAPSED DAYS: NORMAL
ARIA ZRP FRACTIONS TREATED TO DATE: NORMAL
ARIA ZRP PLAN ID: NORMAL
ARIA ZRP PRESCRIBED DOSE CGY: 5000
ARIA ZRP PRESCRIBED DOSE PER FRACTION: 2.5
ARIA ZRR DOSAGE GIVEN TO DATE: 47.5
ARIA ZRR DOSAGE GIVEN TO DATE: 52.78
ARIA ZRR REFERENCE POINT ID: NORMAL
ARIA ZRR REFERENCE POINT ID: NORMAL
ARIA ZRR SESSION DOSAGE GIVEN: 2.5
ARIA ZRR SESSION DOSAGE GIVEN: 2.78
MLH ARIA ZRC TREATMENT DATES: NORMAL

## 2024-01-11 PROCEDURE — 77336 RADIATION PHYSICS CONSULT: CPT | Performed by: RADIOLOGY

## 2024-01-11 PROCEDURE — 77412 RADIATION TX DELIVERY LVL 3: CPT | Performed by: RADIOLOGY

## 2024-01-11 ASSESSMENT — ENCOUNTER SYMPTOMS
COLOR CHANGE: 1
MUSCULOSKELETAL NEGATIVE: 1
PSYCHIATRIC NEGATIVE: 1
CONSTITUTIONAL NEGATIVE: 1

## 2024-01-11 ASSESSMENT — PAIN SCALES - GENERAL: PAINLEVEL: 0-NO PAIN

## 2024-01-11 NOTE — PROGRESS NOTES
Subjective      Patient ID: Geneva Martin is a 63 y.o. female.  1960   Diagnosis Plan   1. Basal cell carcinoma of dorsum of nose          Diagnosis:  No diagnosis found.    HPI patient notes mild skin irritation nose and nostril Silvadene cream and Aquaphor help.    The following have been reviewed and updated as appropriate in this visit:        Review of Systems   Constitutional: Negative.    HENT: Positive for nosebleeds (clotting inside nose.  Pt is using Silvadene in nose with good results).    Musculoskeletal: Negative.    Skin: Positive for color change (Increased erythema/Pt is using Silvadene and Aquaphor.).   Psychiatric/Behavioral: Negative.    EOT tomorrow.    Objective     Vitals:    01/11/24 0834   BP: 136/74   Pulse: 90     There is no height or weight on file to calculate BMI.    Physical Exam mild erythema and of nose.  Healed incision no desquamation noted.  Nostrils clear.  Crackles at base of nose otherwise clear.    Assessment/Plan Continue radiation therapy to 5000 cGy as previously outlined.  Follow-up in the spring after return from [place].  Continue Aquaphor ointment and Silvadene cream for radiation dermatitis.  Diagnoses and Orders Associated With This Visit:  There are no diagnoses linked to this encounter.

## 2024-01-12 ENCOUNTER — HOSPITAL ENCOUNTER (OUTPATIENT)
Dept: RADIATION ONCOLOGY | Facility: HOSPITAL | Age: 64
Setting detail: RADIATION/ONCOLOGY SERIES
Discharge: HOME | End: 2024-01-12
Attending: RADIOLOGY
Payer: COMMERCIAL

## 2024-01-12 LAB
ARIA ZRC COURSE ID: NORMAL
ARIA ZRC COURSE INTENT: NORMAL
ARIA ZRC COURSE START DATE: NORMAL
ARIA ZRC TREATMENT ELAPSED DAYS: NORMAL
ARIA ZRP FRACTIONS TREATED TO DATE: NORMAL
ARIA ZRP PLAN ID: NORMAL
ARIA ZRP PRESCRIBED DOSE CGY: 5000
ARIA ZRP PRESCRIBED DOSE PER FRACTION: 2.5
ARIA ZRR DOSAGE GIVEN TO DATE: 50
ARIA ZRR DOSAGE GIVEN TO DATE: 55.56
ARIA ZRR REFERENCE POINT ID: NORMAL
ARIA ZRR REFERENCE POINT ID: NORMAL
ARIA ZRR SESSION DOSAGE GIVEN: 2.5
ARIA ZRR SESSION DOSAGE GIVEN: 2.78
MLH ARIA ZRC TREATMENT DATES: NORMAL

## 2024-01-12 PROCEDURE — 77412 RADIATION TX DELIVERY LVL 3: CPT | Performed by: RADIOLOGY

## 2024-01-15 LAB
ARIA ZRC COURSE ID: NORMAL
ARIA ZRC COURSE INTENT: NORMAL
ARIA ZRC COURSE START DATE: NORMAL
ARIA ZRC TREATMENT ELAPSED DAYS: NORMAL
ARIA ZRP FRACTIONS TREATED TO DATE: NORMAL
ARIA ZRP PLAN ID: NORMAL
ARIA ZRP PLAN NAME: NORMAL
ARIA ZRP PRESCRIBED DOSE CGY: 5000
ARIA ZRP PRESCRIBED DOSE PER FRACTION: 2.5
ARIA ZRR DOSAGE GIVEN TO DATE: 50
ARIA ZRR DOSAGE GIVEN TO DATE: 55.56
ARIA ZRR REFERENCE POINT ID: NORMAL
ARIA ZRR REFERENCE POINT ID: NORMAL
MLH ARIA ZRC TREATMENT DATES: NORMAL

## 2024-06-11 ENCOUNTER — RAD ONC OTV (OUTPATIENT)
Dept: HEMATOLOGY/ONCOLOGY | Facility: HOSPITAL | Age: 64
End: 2024-06-11
Payer: COMMERCIAL

## 2024-06-11 ENCOUNTER — DOCUMENTATION (OUTPATIENT)
Dept: RADIATION ONCOLOGY | Facility: HOSPITAL | Age: 64
End: 2024-06-11
Payer: COMMERCIAL

## 2024-06-11 NOTE — PROGRESS NOTES
Basal cell nose  EOT:      LOV 1/11/24 with Dr. Ochsner:  pt returning from [place].  Was using Silvadene/Aquaphor.    No imaging or blood work.

## 2024-06-13 ENCOUNTER — HOSPITAL ENCOUNTER (OUTPATIENT)
Dept: RADIATION ONCOLOGY | Facility: HOSPITAL | Age: 64
Setting detail: RADIATION/ONCOLOGY SERIES
Discharge: HOME | End: 2024-06-13
Attending: RADIOLOGY
Payer: COMMERCIAL

## 2024-06-13 VITALS
SYSTOLIC BLOOD PRESSURE: 115 MMHG | OXYGEN SATURATION: 99 % | WEIGHT: 121.2 LBS | HEART RATE: 78 BPM | DIASTOLIC BLOOD PRESSURE: 72 MMHG

## 2024-06-13 DIAGNOSIS — C44.311 BASAL CELL CARCINOMA OF DORSUM OF NOSE: Primary | ICD-10-CM

## 2024-06-13 RX ORDER — LEVOTHYROXINE, LIOTHYRONINE 76; 18 UG/1; UG/1
120 TABLET ORAL 2 TIMES DAILY
COMMUNITY
Start: 2024-03-18

## 2024-06-13 ASSESSMENT — PATIENT HEALTH QUESTIONNAIRE - PHQ9: SUM OF ALL RESPONSES TO PHQ9 QUESTIONS 1 & 2: 0

## 2024-06-13 ASSESSMENT — ENCOUNTER SYMPTOMS
RESPIRATORY NEGATIVE: 1
ENDOCRINE NEGATIVE: 1
GASTROINTESTINAL NEGATIVE: 1
EYES NEGATIVE: 1
NEUROLOGICAL NEGATIVE: 1
OCCASIONAL FEELINGS OF UNSTEADINESS: 0
DEPRESSION: 0
HEMATOLOGIC/LYMPHATIC NEGATIVE: 1
CONSTITUTIONAL NEGATIVE: 1
PSYCHIATRIC NEGATIVE: 1
CARDIOVASCULAR NEGATIVE: 1
LOSS OF SENSATION IN FEET: 0
MUSCULOSKELETAL NEGATIVE: 1

## 2024-06-13 ASSESSMENT — PAIN SCALES - GENERAL: PAINLEVEL: 0-NO PAIN

## 2024-06-13 NOTE — PROGRESS NOTES
Patient ID:  Geneva Martin is a 63 y.o. female.  1960   Diagnosis Plan   1. Basal cell carcinoma of dorsum of nose           Referring Physician:   No referring provider defined for this encounter.    Primary Care Provider:  Tomás Pollard DO    Problem List:  Patient Active Problem List    Diagnosis Date Noted    Basal cell carcinoma of dorsum of nose 11/27/2023        Cancer Staging   No matching staging information was found for the patient.      Subjective    History of Present Illness:  The following portions of the patient's history were reviewed and updated as appropriate: allergies, current medications, past family history, past medical history, past social history, and past surgical history.   HPI patient presents without complaints.  Denies problems with bleeding.  Happy with cosmetic results.  Review of Systems   Constitutional: Negative.    HENT:  Negative.     Eyes: Negative.    Respiratory: Negative.     Cardiovascular: Negative.    Gastrointestinal: Negative.    Endocrine: Negative.    Genitourinary: Negative.     Musculoskeletal: Negative.    Skin: Negative.    Neurological: Negative.    Hematological: Negative.    Psychiatric/Behavioral: Negative.              Objective      Physical Exam:  Vital Signs for this encounter:  BP: 115/72  Heart Rate: 78  SpO2: 99 %  Weight: 55 kg (121 lb 3.2 oz) (06/13 0807)    Physical Exam no suspicious lesions seen in the nose area.  Nostrils clear.  Excellent cosmesis.  Performance Status:90     PAIN ASSESSMENT:  Score Zero    Location    Pain Intervention      LABS:  No results found for this or any previous visit (from the past 336 hour(s)).       Assessment/Plan patient follow-up with dermatology and plastic surgery no further follow-up with me is necessary.  Picture taken for confirmation purposes.    Diagnoses and Orders Associated With This Visit:  There are no diagnoses linked to this encounter.  TREATMENT PLAN SUMMARY:  Radiation Treatments        Active   No active radiation treatments to show.     Historical   Plans   1_6e Rt Nose   Most recent treatment: Dose planned: 250 cGy (fraction 20 of 20 on 1/12/2024)   Total: Dose planned: 5,000 cGy   Elapsed Days: 29 days as of 2024-01-12 @ 08:3251      Reference Points   1_D=1.2cm   Most recent treatment: Dose given: 278 cGy (on 1/12/2024)   Total: Dose given: 5,556 cGy   Elapsed Days: 29 days as of 2024-01-12 @ 08:3251      1_Rt e nose   Most recent treatment: Dose given: 250 cGy (on 1/12/2024)   Total: Dose given: 5,000 cGy   Elapsed Days: 29 days as of 2024-01-12 @ 08:3251                       THERAPY PLAN:  No therapy plan of the specified type found.

## 2024-06-13 NOTE — LETTER
June 13, 2024                                                          Patient: Geneva Martin   YOB: 1960   Date of Visit: 6/13/2024       Dear Dr. Pollard:    The patient is seen at the [facility] RADIATION THERAPY today. Attached is my assessment and plan of care.  Thank you for the opportunity to share in Geneva Martin's care.       Sincerely,        Gregory J. Ochsner, MD      CC: No Recipients

## 2024-06-13 NOTE — RAD ONC COMPLETION NOTE
"[facility] RADIATION THERAPY  [address] [place] [address]  Dept: [phone]  Loc: [phone]    Objective   Geneva Martin, 1960, has completed a course of radiation.  Geneva Martin was seen and treated in Radiation Oncology at [facility] RADIATION THERAPY.     Geneva Martin, MRN: [id number]   Patient Active Problem List   Diagnosis    Basal cell carcinoma of dorsum of nose       Below you will find the details of the course of radiation therapy.  She tolerated the treatment well.      Treatment Intent: primary.  Cancer Staging   No matching staging information was found for the patient.       Performance Status at the End of Treatment: 90, Able to carry on normal activity; minor signs or symptoms of disease..    LABS:  CMPNo results found for: \"ALBUMIN\", \"CHLORIDE\", \"CO2\", \"BUN\", \"CREATININE\", \"GLUCOSE\", \"AST\", \"ALT\", \"EGFR\"  CBCNo results found for: \"WBC\", \"HGB\", \"HCT\", \"MCV\", \"PLT\"  Tumor MarkerNo results found for: \"\", \"\", \"PSA\", \"\", \"CEA\", \"HCGQUANT\"     Treatment Plan Summary  Radiation Therapy  Radiation Treatments       Active   No active radiation treatments to show.     Historical   Plans   1_6e Rt Nose   Most recent treatment: Dose planned: 250 cGy (fraction 20 of 20 on 1/12/2024)   Total: Dose planned: 5,000 cGy   Elapsed Days: 29 days as of 2024-01-12 @ 08:3251      Reference Points   1_D=1.2cm   Most recent treatment: Dose given: 278 cGy (on 1/12/2024)   Total: Dose given: 5,556 cGy   Elapsed Days: 29 days as of 2024-01-12 @ 08:3251      1_Rt e nose   Most recent treatment: Dose given: 250 cGy (on 1/12/2024)   Total: Dose given: 5,000 cGy   Elapsed Days: 29 days as of 2024-01-12 @ 08:3251                          Assessment/Plan see note from today.    Plan for Follow-up: We plan to see the patient back in in 1 year to monitor immediate post-therapy progress, which will be further documented.  The patient is also informed of the need for " continuing follow-up through your office.    CMS Clinical Data Elements  End of Treatment  [facility] ONCBCN RADIATION COMPLETION FORM ADVANCED       6/13/2024 8:29 AM

## 2024-11-05 ENCOUNTER — APPOINTMENT (RX ONLY)
Dept: URBAN - METROPOLITAN AREA CLINIC 60 | Facility: CLINIC | Age: 64
Setting detail: DERMATOLOGY
End: 2024-11-05

## 2024-11-05 DIAGNOSIS — L57.8 OTHER SKIN CHANGES DUE TO CHRONIC EXPOSURE TO NONIONIZING RADIATION: ICD-10-CM

## 2024-11-05 DIAGNOSIS — L57.0 ACTINIC KERATOSIS: ICD-10-CM

## 2024-11-05 DIAGNOSIS — Z85.828 PERSONAL HISTORY OF OTHER MALIGNANT NEOPLASM OF SKIN: ICD-10-CM

## 2024-11-05 DIAGNOSIS — L81.0 POSTINFLAMMATORY HYPERPIGMENTATION: ICD-10-CM

## 2024-11-05 DIAGNOSIS — D22 MELANOCYTIC NEVI: ICD-10-CM

## 2024-11-05 PROBLEM — D22.39 MELANOCYTIC NEVI OF OTHER PARTS OF FACE: Status: ACTIVE | Noted: 2024-11-05

## 2024-11-05 PROCEDURE — ? LIQUID NITROGEN

## 2024-11-05 PROCEDURE — ? COUNSELING

## 2024-11-05 PROCEDURE — ? ADDITIONAL NOTES

## 2024-11-05 PROCEDURE — 17000 DESTRUCT PREMALG LESION: CPT

## 2024-11-05 PROCEDURE — ? SUNSCREEN RECOMMENDATIONS

## 2024-11-05 PROCEDURE — 99213 OFFICE O/P EST LOW 20 MIN: CPT | Mod: 25

## 2024-11-05 ASSESSMENT — LOCATION DETAILED DESCRIPTION DERM
LOCATION DETAILED: RIGHT NASAL SIDEWALL
LOCATION DETAILED: LEFT CENTRAL MALAR CHEEK
LOCATION DETAILED: RIGHT CENTRAL BUCCAL CHEEK
LOCATION DETAILED: RIGHT INFERIOR NASAL CHEEK
LOCATION DETAILED: LEFT SUPERIOR LATERAL MALAR CHEEK
LOCATION DETAILED: LEFT LATERAL EYEBROW
LOCATION DETAILED: LEFT SUPERIOR CENTRAL BUCCAL CHEEK

## 2024-11-05 ASSESSMENT — LOCATION SIMPLE DESCRIPTION DERM
LOCATION SIMPLE: LEFT EYEBROW
LOCATION SIMPLE: RIGHT CHEEK
LOCATION SIMPLE: RIGHT NOSE
LOCATION SIMPLE: LEFT CHEEK

## 2024-11-05 ASSESSMENT — LOCATION ZONE DERM
LOCATION ZONE: FACE
LOCATION ZONE: NOSE

## 2024-11-05 NOTE — PROCEDURE: SUNSCREEN RECOMMENDATIONS
Products Recommended: Mineral sunscreen
General Sunscreen Counseling: I recommended a broad spectrum sunscreen with a SPF of 30 or higher.  I explained that SPF 30 sunscreens block approximately 97 percent of the sun's harmful rays.  Sunscreens should be applied at least 15 minutes prior to expected sun exposure and then every 2 hours after that as long as sun exposure continues. If swimming or exercising sunscreen should be reapplied every 45 minutes to an hour after getting wet or sweating.  One ounce, or the equivalent of a shot glass full of sunscreen, is adequate to protect the skin not covered by a bathing suit. I also recommended a lip balm with a sunscreen as well. Sun protective clothing can be used in lieu of sunscreen but must be worn the entire time you are exposed to the sun's rays.
Detail Level: Zone

## 2024-11-05 NOTE — PROCEDURE: LIQUID NITROGEN
Show Applicator Variable?: Yes
Detail Level: Simple
Consent: The patient's consent was obtained including but not limited to risks of crusting, scabbing, blistering, scarring, darker or lighter pigmentary change, recurrence, incomplete removal and infection.
Render Post-Care Instructions In Note?: no
Post-Care Instructions: I reviewed with the patient in detail post-care instructions. Patient is to wear sunprotection, and avoid picking at any of the treated lesions. Pt may apply Vaseline to crusted or scabbing areas.
Number Of Freeze-Thaw Cycles: 3 freeze-thaw cycles
Duration Of Freeze Thaw-Cycle (Seconds): 0
